# Patient Record
Sex: MALE | URBAN - METROPOLITAN AREA
[De-identification: names, ages, dates, MRNs, and addresses within clinical notes are randomized per-mention and may not be internally consistent; named-entity substitution may affect disease eponyms.]

---

## 2023-10-13 ENCOUNTER — EMERGENCY (EMERGENCY)
Facility: HOSPITAL | Age: 63
LOS: 1 days | Discharge: HOME | End: 2023-10-13
Attending: STUDENT IN AN ORGANIZED HEALTH CARE EDUCATION/TRAINING PROGRAM | Admitting: EMERGENCY MEDICINE
Payer: MEDICARE

## 2023-10-13 VITALS
HEART RATE: 74 BPM | OXYGEN SATURATION: 100 % | SYSTOLIC BLOOD PRESSURE: 112 MMHG | TEMPERATURE: 98 F | RESPIRATION RATE: 16 BRPM | DIASTOLIC BLOOD PRESSURE: 62 MMHG

## 2023-10-13 VITALS
RESPIRATION RATE: 18 BRPM | TEMPERATURE: 98 F | SYSTOLIC BLOOD PRESSURE: 102 MMHG | HEART RATE: 94 BPM | OXYGEN SATURATION: 99 % | DIASTOLIC BLOOD PRESSURE: 58 MMHG

## 2023-10-13 PROCEDURE — 99283 EMERGENCY DEPT VISIT LOW MDM: CPT

## 2023-10-13 NOTE — ED PROVIDER NOTE - PATIENT PORTAL LINK FT
You can access the FollowMyHealth Patient Portal offered by [facility] by registering at the following website: [email or web address]. By joining [facility]’s FollowMyHealth portal, you will also be able to view your health information using other applications (apps) compatible with our system.

## 2023-10-13 NOTE — ED ADULT NURSE NOTE - NSFALLHARMRISKINTERV_ED_ALL_ED
Assistance OOB with selected safe patient handling equipment if applicable/Assistance with ambulation/Communicate risk of Fall with Harm to all staff, patient, and family/Monitor gait and stability/Monitor for mental status changes and reorient to person, place, and time, as needed/Provide visual cue: red socks, yellow wristband, yellow gown, etc/Reinforce activity limits and safety measures with patient and family/Toileting schedule using arm’s reach rule for commode and bathroom/Use of alarms - bed, stretcher, chair and/or video monitoring/Bed in lowest position, wheels locked, appropriate side rails in place/Call bell, personal items and telephone in reach/Instruct patient to call for assistance before getting out of bed/chair/stretcher/Non-slip footwear applied when patient is off stretcher/[place] to call system/Physically safe environment - no spills, clutter or unnecessary equipment/Purposeful Proactive Rounding/Room/bathroom lighting operational, light cord in reach

## 2023-10-13 NOTE — ED ADULT TRIAGE NOTE - CHIEF COMPLAINT QUOTE
PD found person at a [employer] standing aimlessly. initially registered as a critical [facility] but was found to be  announced as  a missing person form group home. phx  schizophrenia, intellectual disability, pt changed into hospital attire, belongings secured for elopement prevention, no visible signs of injury noted on pt, FS  110

## 2023-10-13 NOTE — ED ADULT TRIAGE NOTE - NS ED NURSE BANDS TYPE
Name band; Split-Thickness Skin Graft Text: The defect edges were debeveled with a #15 scalpel blade.  Given the location of the defect, shape of the defect and the proximity to free margins a split thickness skin graft was deemed most appropriate.  Using a sterile surgical marker, the primary defect shape was transferred to the donor site. The split thickness graft was then harvested.  The skin graft was then placed in the primary defect and oriented appropriately.

## 2023-10-13 NOTE — ED PROVIDER NOTE - CLINICAL SUMMARY MEDICAL DECISION MAKING FREE TEXT BOX
63-year-old male, past medical of intellectual delay and schizophrenia, presents to ED for medical evaluation status post eloping from group home earlier today.  Patient accompanied by group home staff, who provides most of the history given that patient is nonverbal at baseline.  Per staff, patient seemingly eloped from group home at around 9:30 AM per camera feed.  Per group home police were called and missing person report was made.  Patient later found 3 hours later at local [employer] restaurant on [facility].  Group home staff brought to ED for evaluation due to not having patient for the last 3 hours.    Vital signs stable.  Physical exam significant for well-appearing patient in no acute distress.  Head is normocephalic/atraumatic.  Extraocular movements intact.  Pupils equal round reactive to light.  Heart is regular rate and rhythm.  No chest wall tenderness/ecchymosis.  Abdomen is soft/nontender.  No CVAT.  Pulses are normal throughout.  No lower extremity edema.  Skin is warm and well-perfused.  No rashes.  Neuro exam is nonfocal.  Otherwise unremarkable physical exam.    Patient without any medical issues at this time.  Patient is medically cleared to go back to group home at this time.

## 2023-10-13 NOTE — ED ADULT NURSE NOTE - OBJECTIVE STATEMENT
Pt is calm and cooperative in triage, picked up by group home staff member Arslan Erazo # [phone]. Evaluated by Dr Simi Godwin in triage and cleared for discharge back to group [facility]. Belongings given back to [facility] staff, pt changed and picked up by [facility] transport.

## 2024-05-13 ENCOUNTER — OUTPATIENT (OUTPATIENT)
Dept: OUTPATIENT SERVICES | Facility: HOSPITAL | Age: 64
LOS: 1 days | End: 2024-05-13
Payer: MEDICARE

## 2024-05-13 VITALS — HEIGHT: 65 IN | WEIGHT: 145.95 LBS

## 2024-05-13 DIAGNOSIS — K05.6 PERIODONTAL DISEASE, UNSPECIFIED: ICD-10-CM

## 2024-05-13 DIAGNOSIS — K02.62 DENTAL CARIES ON SMOOTH SURFACE PENETRATING INTO DENTIN: ICD-10-CM

## 2024-05-13 DIAGNOSIS — Z78.9 OTHER SPECIFIED HEALTH STATUS: Chronic | ICD-10-CM

## 2024-05-13 DIAGNOSIS — Z01.818 ENCOUNTER FOR OTHER PREPROCEDURAL EXAMINATION: ICD-10-CM

## 2024-05-13 DIAGNOSIS — K02.9 DENTAL CARIES, UNSPECIFIED: ICD-10-CM

## 2024-05-13 RX ORDER — EPINEPHRINE 0.3 MG/.3ML
0.3 INJECTION INTRAMUSCULAR; SUBCUTANEOUS
Refills: 0 | DISCHARGE

## 2024-05-13 RX ORDER — BUDESONIDE, MICRONIZED 100 %
2 POWDER (GRAM) MISCELLANEOUS
Refills: 0 | DISCHARGE

## 2024-05-13 RX ORDER — DIPHENHYDRAMINE HCL 50 MG
1 CAPSULE ORAL
Refills: 0 | DISCHARGE

## 2024-05-13 RX ORDER — CARBAMIDE PEROXIDE 81.86 MG/ML
5 SOLUTION/ DROPS AURICULAR (OTIC)
Refills: 0 | DISCHARGE

## 2024-05-13 RX ORDER — TAMSULOSIN HYDROCHLORIDE 0.4 MG/1
2 CAPSULE ORAL
Refills: 0 | DISCHARGE

## 2024-05-13 RX ORDER — FLUOXETINE HCL 10 MG
1 CAPSULE ORAL
Refills: 0 | DISCHARGE

## 2024-05-13 RX ORDER — ALBUTEROL 90 UG/1
3 AEROSOL, METERED ORAL
Refills: 0 | DISCHARGE

## 2024-05-13 RX ORDER — LURASIDONE HYDROCHLORIDE 40 MG/1
1 TABLET ORAL
Refills: 0 | DISCHARGE

## 2024-05-13 NOTE — H&P PST ADULT - HISTORY OF PRESENT ILLNESS
62 yo male presents to [facility] prior to scheduled comprehensive dental treatment on 5/31/24 with Dr. Yessi Loera. PMhx includes severe intellectual disability (nonverbal), personality disorder, chronic paranoid schizophrenia, anxiety, vit D deficiency, hydrocephalus, cellulitis (treated 2/2024).  62 yo male presents to [facility] prior to scheduled comprehensive dental treatment on 5/31/24 with Dr. Yessi Loera. PMhx includes severe intellectual disability (nonverbal), personality disorder, chronic paranoid schizophrenia, anxiety, vit D deficiency, hydrocephalus, cellulitis (treated 2/2024).     Very limited exam d/t uncooperative patient. Limited information - awaiting email from [facility] supervisor. Supervisor reports patient is new to the facility, has limited information on his medical history. Will go through records and email information.     Patient has productive cough; staff deny fever, chills. Chest xray ordered. 64 yo male presents to PST prior to scheduled comprehensive dental treatment on 5/31/24 with Dr. Yessi Loera. PMhx includes severe intellectual disability (nonverbal), personality disorder, chronic paranoid schizophrenia, anxiety, vit D deficiency, hydrocephalus, cellulitis (treated 2/2024).     Very limited exam d/t uncooperative patient. Limited information - awaiting email from [facility] supervisor. Supervisor reports patient is new to the facility, has limited information on his medical history. Will go through records and email information.   Unclear if patient's brother Emiliano Mcmahon is guardian - [phone]. Unable to reach at time of PST.  Facility    - [facility] - [phone] - unable to reach at time of PST.    Patient has productive cough; staff deny fever, chills. Chest xray ordered. 62 yo male presents to PST prior to scheduled comprehensive dental treatment on 5/31/24 with Dr. Yessi Loera. PMhx includes severe intellectual disability (nonverbal), personality disorder, chronic paranoid schizophrenia, anxiety, vit D deficiency, hydrocephalus, cellulitis (treated 2/2024).     Very limited exam d/t uncooperative patient. Limited information - awaiting email from [facility] supervisor. Supervisor reports patient is new to the facility, has limited information on his medical history. Will go through records and email information.   Unclear if patient's brother Emiliano Mcmahon is guardian - [phone]. Unable to reach at time of PST.   - [facility] - [phone] - unable to reach at time of PST.    Patient has productive cough; staff deny fever, chills. Chest xray ordered - patient did not tolerate.    ** 4:50 PM ** Spoke w/ KATE Urias at Group Home. Patient will see PCP for clearance and staff will send pulmonary note for clarification - unable to provide name of pulmonologist at this time. Patient will take Ativan and Benadryl prior to surgery and future medical appointments as patient "gets very combative".  64 yo male presents to PST prior to scheduled comprehensive dental treatment on 5/31/24 with Dr. Yessi Loera. PMhx includes asthma (staff deny recent exacerbations/hospitalizations; on Pulmicort daily), severe intellectual disability (nonverbal), personality disorder, chronic paranoid schizophrenia, anxiety, vit D deficiency, hydrocephalus, cellulitis (treated 2/2024).     Very limited exam d/t uncooperative patient. Limited information - awaiting email from [facility] supervisor. Supervisor reports patient is new to the facility, has limited information on his medical history. Will go through records and email information.   Unclear if patient's brother Emiliano Mcmahon is guardian - [phone]. Unable to reach at time of PST.   - [facility] - [phone] - unable to reach at time of PST.    Patient has productive cough; staff deny fever, chills. Chest xray ordered - patient did not tolerate.    ** 4:50 PM ** Spoke w/ KATE Urias at Group Home. Patient will see PCP for clearance and staff will send pulmonary note for clarification - unable to provide name of pulmonologist at this time. Patient will take Ativan and Benadryl prior to surgery and future medical appointments as patient "gets very combative".

## 2024-05-13 NOTE — H&P PST ADULT - NSICDXPASTMEDICALHX_GEN_ALL_CORE_FT
PAST MEDICAL HISTORY:  Cellulitis of foot     Chronic paranoid schizophrenia     H/O hydrocephalus     H/O personality disorder     History of BPH     Severe intellectual disability

## 2024-05-13 NOTE — H&P PST ADULT - NSANTHOSAYNRD_GEN_A_CORE
No. LARY screening performed.  STOP BANG Legend: 0-2 = LOW Risk; 3-4 = INTERMEDIATE Risk; 5-8 = HIGH Risk

## 2024-05-13 NOTE — H&P PST ADULT - PROBLEM SELECTOR PLAN 1
Comprehensive dental treatment under anesthesia on 5/31/24 with Dr. Yessi Loera.  Pre-op instructions given. Questions answered. Comprehensive dental treatment under anesthesia on 5/31/24 with Dr. Yessi Loera.  Pre-op instructions given. Questions answered.  Medical evaluation prior to surgery.  Chest xray ordered today. Comprehensive dental treatment under anesthesia on 5/31/24 with Dr. Yessi Loera.  Pre-op instructions given. Questions answered.  Medical evaluation prior to surgery.  Advised to pre-medicate prior to procedure with Ativan/Benadryl as prescribed.

## 2024-05-13 NOTE — H&P PST ADULT - ASSESSMENT
DASI score:  DASI activity:  Loose teeth or denture:  DASI score: 6  DASI activity: walks, 2+ flights of stairs - medical assistant reports pt is "Short of breath" when active + cough  Loose teeth or denture: unknown

## 2024-05-13 NOTE — H&P PST ADULT - WEIGHT IN KG
Per patient's history, there seem to be aspects of orthostatic hypotension, or possible vasovagal syncope. However, he has been having increasing light-headedness with exertion which is concerning for cardiac etiology. EKG in the clinic was reassuring. Pt also has apparently chronic hypertension but there is a significant difference of about 20 points systolic BP between the two arms. He reports some calf cramping at night which started as soon as he started taking losartan, but no leg swelling, no new cough with the leg cramping, and patient denies ever having chest pain. Wells score is zero; unlikely PE. No signs of seizure based on HPI, no personal or family hx of seizure.  DDx includes orthostatic hypotension, vasovagal syncope, cardiomyopathy/HF/structural heart disorder, subclavian steal syndrome, COPD.  -ER precautions reviewed with patient  -Echocardiogram ordered  -Consider CT chest, although pt does have Cr=1.8 recent labs and no baseline.  -Urgent referral to cardiology placed   66.2

## 2024-12-06 ENCOUNTER — APPOINTMENT (OUTPATIENT)
Dept: RADIOLOGY | Facility: IMAGING CENTER | Age: 64
End: 2024-12-06
Payer: MEDICARE

## 2024-12-06 ENCOUNTER — OUTPATIENT (OUTPATIENT)
Dept: OUTPATIENT SERVICES | Facility: HOSPITAL | Age: 64
LOS: 1 days | End: 2024-12-06
Payer: MEDICARE

## 2024-12-06 DIAGNOSIS — Z78.9 OTHER SPECIFIED HEALTH STATUS: Chronic | ICD-10-CM

## 2024-12-06 DIAGNOSIS — M1A.0720 IDIOPATHIC CHRONIC GOUT, LEFT ANKLE AND FOOT, WITHOUT TOPHUS (TOPHI): ICD-10-CM

## 2024-12-06 PROBLEM — Z87.438 PERSONAL HISTORY OF OTHER DISEASES OF MALE GENITAL ORGANS: Chronic | Status: ACTIVE | Noted: 2024-05-13

## 2024-12-06 PROBLEM — Z00.00 ENCOUNTER FOR PREVENTIVE HEALTH EXAMINATION: Status: ACTIVE | Noted: 2024-12-06

## 2024-12-06 PROBLEM — F20.0 PARANOID SCHIZOPHRENIA: Chronic | Status: ACTIVE | Noted: 2024-05-13

## 2024-12-06 PROBLEM — Z86.59 PERSONAL HISTORY OF OTHER MENTAL AND BEHAVIORAL DISORDERS: Chronic | Status: ACTIVE | Noted: 2024-05-13

## 2024-12-06 PROBLEM — F72 SEVERE INTELLECTUAL DISABILITIES: Chronic | Status: ACTIVE | Noted: 2024-05-13

## 2024-12-06 PROBLEM — L03.119 CELLULITIS OF UNSPECIFIED PART OF LIMB: Chronic | Status: ACTIVE | Noted: 2024-05-13

## 2024-12-06 PROBLEM — Z86.69 PERSONAL HISTORY OF OTHER DISEASES OF THE NERVOUS SYSTEM AND SENSE ORGANS: Chronic | Status: ACTIVE | Noted: 2024-05-13

## 2024-12-06 PROCEDURE — 73610 X-RAY EXAM OF ANKLE: CPT | Mod: 26,RT

## 2024-12-06 PROCEDURE — 73610 X-RAY EXAM OF ANKLE: CPT

## 2024-12-06 PROCEDURE — 73630 X-RAY EXAM OF FOOT: CPT | Mod: 26,RT

## 2024-12-06 PROCEDURE — 73630 X-RAY EXAM OF FOOT: CPT

## 2025-01-16 ENCOUNTER — OUTPATIENT (OUTPATIENT)
Dept: OUTPATIENT SERVICES | Facility: HOSPITAL | Age: 65
LOS: 1 days | End: 2025-01-16
Payer: MEDICARE

## 2025-01-16 VITALS — HEIGHT: 68 IN | WEIGHT: 158.07 LBS

## 2025-01-16 DIAGNOSIS — K05.6 PERIODONTAL DISEASE, UNSPECIFIED: ICD-10-CM

## 2025-01-16 DIAGNOSIS — Z78.9 OTHER SPECIFIED HEALTH STATUS: Chronic | ICD-10-CM

## 2025-01-16 DIAGNOSIS — Z01.818 ENCOUNTER FOR OTHER PREPROCEDURAL EXAMINATION: ICD-10-CM

## 2025-01-16 PROCEDURE — G0463: CPT

## 2025-01-16 RX ORDER — RISPERIDONE 0.5 MG/1
1 TABLET ORAL
Refills: 0 | DISCHARGE

## 2025-01-16 NOTE — H&P PST ADULT - HISTORY OF PRESENT ILLNESS
64 year old male with PMH asthma (staff denies any exacerbations/hospitalizations; on pulmicort daily), severe intellectual disability (nonverbal), personality disorder, chronic paranoid schizophrenia, anxiety, hydrocephalus, cellulitis (treated 2/2024) and dental caries presents to PST for scheduled comprehensive dental treatment under anesthesia with Dr. La on 2/5/25 at the ambulatory care center.    **Of note, pt uncooperative during PST visit. Pt lethargic due to pre-medication with ativan/benadryl as per group home staff however, when stimulated, pt extremely combative. Pending M/E and blood work with PCP prior to upcoming procedure.    Legal Guardian  Blaze Flannery  [phone]    Resides at [address]    Cain Valentin  [phone] 64 year old male with PMH asthma (staff denies any exacerbations/hospitalizations; on pulmicort daily), severe intellectual disability (nonverbal), personality disorder, chronic paranoid schizophrenia, anxiety, hydrocephalus, cellulitis (treated 2/2024) and dental caries presents to PST for scheduled comprehensive dental treatment under anesthesia with Dr. La on 2/5/25 at the ambulatory care center.    **Of note, pt uncooperative during PST visit. Pt lethargic due to pre-medication with ativan/benadryl as per group home staff however, when stimulated, pt extremely combative. Pending M/E and blood work with PCP prior to upcoming procedure.    Legal Guardian (Brother)  Blaze Flannery  [phone]    Resides at [address]    Cain Valentin  [phone] 64 year old male with PMH asthma (staff denies any exacerbations/hospitalizations; on pulmicort daily), severe intellectual disability (nonverbal), personality disorder, chronic paranoid schizophrenia, anxiety, hydrocephalus, cellulitis (treated 2/2024) and dental caries presents to PST for scheduled comprehensive dental treatment under anesthesia with Dr. La on 2/5/25 at the ambulatory care center.    **Of note, pt uncooperative during PST visit. Pt lethargic due to pre-medication with ativan/benadryl as per group home staff however, when stimulated, pt extremely combative. Pending M/E and blood work with PCP prior to upcoming procedure. Pt had PST for same procedure 5/2024 however, he did not have the procedure done. Group home staff/coordinator unsure as to why the procedure was not performed.    Legal Guardian (Brother)  Blaze Flannery  [phone]    Resides at [address]    Cain Valentin  [phone]

## 2025-01-16 NOTE — H&P PST ADULT - ASSESSMENT
DASI score: 4.31  DASI activity: Able to walk 2-3 blocks, assistance with ADLs, 1 Flight of Stairs, attends day program M-F  Loose teeth or denture: denies

## 2025-01-16 NOTE — H&P PST ADULT - NSICDXPASTMEDICALHX_GEN_ALL_CORE_FT
PAST MEDICAL HISTORY:  Cellulitis of foot     Chronic paranoid schizophrenia     Dental caries on smooth surface penetrating into dentin     H/O hydrocephalus     H/O personality disorder     History of BPH     Periodontal disease, unspecified     Severe intellectual disability      PAST MEDICAL HISTORY:  Asthma     Cellulitis of foot     Chronic paranoid schizophrenia     Dental caries on smooth surface penetrating into dentin     H/O hydrocephalus     H/O personality disorder     History of BPH     Periodontal disease, unspecified     Severe intellectual disability

## 2025-01-16 NOTE — H&P PST ADULT - PROBLEM SELECTOR PLAN 1
Pt is scheduled for a comprehensive dental treatment under anesthesia with Dr. La on 2/5/25 at the ambulatory care center  Labs to be drawn at M/E as patient was combative upon trying to remove winter jacket, pending CBC and BMP  Pending M/E, appt TBD

## 2025-02-05 ENCOUNTER — TRANSCRIPTION ENCOUNTER (OUTPATIENT)
Age: 65
End: 2025-02-05

## 2025-02-19 ENCOUNTER — OUTPATIENT (OUTPATIENT)
Dept: OUTPATIENT SERVICES | Facility: HOSPITAL | Age: 65
LOS: 1 days | End: 2025-02-19

## 2025-02-19 VITALS — HEIGHT: 66 IN | RESPIRATION RATE: 14 BRPM | WEIGHT: 156.97 LBS | TEMPERATURE: 98 F

## 2025-02-19 DIAGNOSIS — Z78.9 OTHER SPECIFIED HEALTH STATUS: Chronic | ICD-10-CM

## 2025-02-19 DIAGNOSIS — K02.9 DENTAL CARIES, UNSPECIFIED: ICD-10-CM

## 2025-02-19 DIAGNOSIS — K05.6 PERIODONTAL DISEASE, UNSPECIFIED: ICD-10-CM

## 2025-02-19 PROBLEM — J45.909 UNSPECIFIED ASTHMA, UNCOMPLICATED: Chronic | Status: ACTIVE | Noted: 2025-01-16

## 2025-02-19 PROBLEM — K02.62 DENTAL CARIES ON SMOOTH SURFACE PENETRATING INTO DENTIN: Chronic | Status: ACTIVE | Noted: 2025-01-16

## 2025-02-19 NOTE — H&P PST ADULT - OTHER CARE PROVIDERS
Dr. Janusz Combs ([facility]) [phone] Dr. Janusz Combs ([facility]) [phone], Dr. Ziyad Alegria psych

## 2025-02-19 NOTE — H&P PST ADULT - CONSTITUTIONAL COMMENTS
pt is combative easily agitated,  unable to do physical exam,  only says one word NO, does not follow commands pt is combative, uncooperative  easily agitated,  unable to do physical exam,  only says one word NO, does not follow commands

## 2025-02-19 NOTE — H&P PST ADULT - NSICDXPASTMEDICALHX_GEN_ALL_CORE_FT
PAST MEDICAL HISTORY:  Asthma     Cellulitis of foot     Chronic paranoid schizophrenia     Dental caries on smooth surface penetrating into dentin     H/O hydrocephalus     H/O personality disorder     History of BPH     Periodontal disease, unspecified     Severe intellectual disability      PAST MEDICAL HISTORY:  Asthma     Cellulitis of foot     Chronic paranoid schizophrenia     Constipation     Dental caries on smooth surface penetrating into dentin     H/O hydrocephalus     H/O personality disorder     History of BPH     History of COPD     Periodontal disease, unspecified     Severe intellectual disability

## 2025-02-19 NOTE — H&P PST ADULT - PROBLEM SELECTOR PLAN 1
Pt scheduled for comprehensive dental treatment under general anesthesia on 2/21/2025.  Preop teaching done, Lacho able to verbalize understanding.   medication day of procedure ativan, risperidone, fluoxetine, albuterol, budesonide      Legal Guardian (Brother)Blaze Flannery [phone] Pt scheduled for comprehensive dental treatment under general anesthesia on 2/21/2025.  Preop teaching done, Lacho able to verbalize understanding.   medication day of procedure ativan, risperidone, fluoxetine, albuterol, budesonide      Legal Guardian (Brother)Blaze Flannery [phone] Pt scheduled for comprehensive dental treatment under general anesthesia on 2/21/2025.  Preop teaching done, Lacho able to verbalize understanding.   medication day of procedure ativan, risperidone, fluoxetine, albuterol, budesonide     [facility] anesthesia floor email notified pt is uncooperative/ combative      Legal Guardian (Brother)Blaze Avelarez [phone]-  spoke with via telephone will be available to day of procedure for verbal consent Pt scheduled for comprehensive dental treatment under general anesthesia on 2/21/2025.  Preop teaching done, Lacho able to verbalize understanding.   Pt combative and uncooperative unable to do physical and vital signs  medication day of procedure ativan, risperidone, fluoxetine, albuterol, budesonide     [facility] anesthesia floor email notified pt is uncooperative/ combative      Legal Guardian (Brother)Blaze Flannery [phone]-  spoke with via telephone will be available to day of procedure for verbal consent

## 2025-02-19 NOTE — H&P PST ADULT - NSANTHAGERD_ENT_A_CORE

## 2025-02-19 NOTE — H&P PST ADULT - NEGATIVE GENERAL SYMPTOMS
no fever/no chills/no sweating/no anorexia/no polyphagia/no polyuria/no polydipsia/no malaise/no fatigue no fever/no chills/no sweating/no anorexia/no weight loss/no weight gain/no polyphagia/no polyuria/no polydipsia/no malaise/no fatigue

## 2025-02-19 NOTE — H&P PST ADULT - COMMENTS
unable to obtain hr, bp, O2 sat pt combative unable to obtain hr, bp, O2 sat pt combative/ uncooperative

## 2025-02-19 NOTE — H&P PST ADULT - GENITOURINARY COMMENTS
Please take the antibiotics until complete.  I highly recommend using a probiotic for the week of the antibiotics and for a week to follow.  You may use Flonase 2 sprays in each nostril daily which can help with congestion and inflammation in the sinuses nasal passages.  Mucinex may be helpful for your secretions.    If despite these measures ear symptoms worsen or fail to improve in the next 5-7 days, please return for re-evaluation.    
able to use the bathroom to urinate

## 2025-02-19 NOTE — H&P PST ADULT - HISTORY OF PRESENT ILLNESS
64 year old male with PMH asthma (staff denies any exacerbations/hospitalizations; on pulmicort daily), severe intellectual disability (nonverbal), personality disorder, chronic paranoid schizophrenia, anxiety, hydrocephalus, cellulitis (treated 2/2024) and dental caries presents to PST for scheduled comprehensive dental treatment under anesthesia with Dr. La on 2/5/25 at the ambulatory care center.    **Of note, pt uncooperative during PST visit. Pt lethargic due to pre-medication with ativan/benadryl as per group home staff however, when stimulated, pt extremely combative. Pending M/E and blood work with PCP prior to upcoming procedure. Pt had PST for same procedure 5/2024 however, he did not have the procedure done. Group home staff/coordinator unsure as to why the procedure was not performed.    Legal Guardian (Brother)  Blaze Flannery  [phone]    Resides at [address]    Cain Valentin  [phone] 63y/o male scheduled for comprehensive dental treatment under general anesthesia on 2/21/2025.  Pt with hx copd/asthma severe intellectual disability, personality disorder, chronic paranoid schizophrenia, anxiety, hydrocephalus, cellulitis (treated 2/2024).  Was previously scheduled to have the procedure at [place] 2/5/2025, canceled due to ate prior to procedure.

## 2025-02-20 NOTE — ASU PATIENT PROFILE, ADULT - NSICDXPASTMEDICALHX_GEN_ALL_CORE_FT
PAST MEDICAL HISTORY:  Asthma     Cellulitis of foot     Chronic paranoid schizophrenia     Constipation     Dental caries on smooth surface penetrating into dentin     H/O hydrocephalus     H/O personality disorder     History of BPH     History of COPD     Periodontal disease, unspecified     Severe intellectual disability

## 2025-02-20 NOTE — ASU PATIENT PROFILE, ADULT - FALL HARM RISK - HARM RISK INTERVENTIONS

## 2025-02-21 ENCOUNTER — TRANSCRIPTION ENCOUNTER (OUTPATIENT)
Age: 65
End: 2025-02-21

## 2025-02-21 ENCOUNTER — OUTPATIENT (OUTPATIENT)
Dept: INPATIENT UNIT | Facility: HOSPITAL | Age: 65
LOS: 1 days | Discharge: HOME | End: 2025-02-21

## 2025-02-21 VITALS — HEIGHT: 66 IN | DIASTOLIC BLOOD PRESSURE: 86 MMHG | WEIGHT: 156.97 LBS | SYSTOLIC BLOOD PRESSURE: 137 MMHG

## 2025-02-21 VITALS
HEART RATE: 83 BPM | OXYGEN SATURATION: 96 % | RESPIRATION RATE: 16 BRPM | DIASTOLIC BLOOD PRESSURE: 83 MMHG | SYSTOLIC BLOOD PRESSURE: 153 MMHG

## 2025-02-21 DIAGNOSIS — Z78.9 OTHER SPECIFIED HEALTH STATUS: Chronic | ICD-10-CM

## 2025-02-21 DIAGNOSIS — K02.62 DENTAL CARIES ON SMOOTH SURFACE PENETRATING INTO DENTIN: ICD-10-CM

## 2025-02-21 DEVICE — SURGICEL 2 X 14": Type: IMPLANTABLE DEVICE | Status: FUNCTIONAL

## 2025-02-21 RX ORDER — KETOCONAZOLE 20 MG/G
1 CREAM TOPICAL
Refills: 0 | DISCHARGE

## 2025-02-21 RX ORDER — ALBUTEROL SULFATE 2.5 MG/3ML
0 VIAL, NEBULIZER (ML) INHALATION
Refills: 0 | DISCHARGE

## 2025-02-21 RX ORDER — SODIUM CHLORIDE 9 G/1000ML
1000 INJECTION, SOLUTION INTRAVENOUS
Refills: 0 | Status: ACTIVE | OUTPATIENT
Start: 2025-02-21 | End: 2026-01-20

## 2025-02-21 RX ORDER — MIDAZOLAM IN 0.9 % SOD.CHLORID 1 MG/ML
15 PLASTIC BAG, INJECTION (ML) INTRAVENOUS ONCE
Refills: 0 | Status: DISCONTINUED | OUTPATIENT
Start: 2025-02-21 | End: 2025-02-21

## 2025-02-21 RX ORDER — FENTANYL CITRATE-0.9 % NACL/PF 100MCG/2ML
50 SYRINGE (ML) INTRAVENOUS ONCE
Refills: 0 | Status: DISCONTINUED | OUTPATIENT
Start: 2025-02-21 | End: 2025-02-21

## 2025-02-21 RX ORDER — FENTANYL CITRATE-0.9 % NACL/PF 100MCG/2ML
25 SYRINGE (ML) INTRAVENOUS
Refills: 0 | Status: DISCONTINUED | OUTPATIENT
Start: 2025-02-21 | End: 2025-02-21

## 2025-02-21 RX ORDER — ONDANSETRON HCL/PF 4 MG/2 ML
4 VIAL (ML) INJECTION EVERY 6 HOURS
Refills: 0 | Status: ACTIVE | OUTPATIENT
Start: 2025-02-21 | End: 2026-01-20

## 2025-02-21 RX ADMIN — Medication 15 MILLIGRAM(S): at 14:19

## 2025-02-21 NOTE — ASU DISCHARGE PLAN (ADULT/PEDIATRIC) - NURSING INSTRUCTIONS
You received IV Tylenol for pain management at 3:25 PM . Please DO NOT take any Tylenol (Acetaminophen) containing products, such as Vicodin, Percocet, Excedrin, and cold medications for the next 6 hours (until 9:25 PM). DO NOT TAKE MORE THAN 4000 MG OF TYLENOL in a 24 hour period.    You received IV Toradol for pain management at 3:30 PM. Please DO NOT take Motrin/Ibuprofen/Advil/Aleve/NSAIDs (Non-Steroidal Anti-Inflammatory Drugs) for the next 6 hours (until 9:30 PM).

## 2025-02-21 NOTE — ASU DISCHARGE PLAN (ADULT/PEDIATRIC) - NS MD DC FALL RISK RISK
For information on Fall & Injury Prevention, visit: [email or web address] OR  [email or web address] OR  https://www.cdc.gov/steadi/patient.html

## 2025-02-21 NOTE — ASU DISCHARGE PLAN (ADULT/PEDIATRIC) - ASU DC SPECIAL INSTRUCTIONSFT
comprehensive dental treatment under general anesthesia, exam, xrays, cleaning, and fluoride and one extraction of one molar performed to the lower right side and one premolar to the UR due to infection    clindamycin will be prescribed, no straw for two days and keep head elevated for the next two days and nights     tylenol prn pain and give him tylenol or motrin for the next two days for comfort     see DR La in one week, appointment will be at [facility] [phone] on 3/3 at 1:15 pm     resume all medications    return to routine activities tomorrow if patient feels well    ice to the face right side 20 minutes on and off today

## 2025-02-21 NOTE — ASU DISCHARGE PLAN (ADULT/PEDIATRIC) - FINANCIAL ASSISTANCE
[facility] provides services at a reduced cost to those who are determined to be eligible through [facility]’s financial assistance program. Information regarding [facility]’s financial assistance program can be found by going to [email or web address] or by calling [phone].

## (undated) DEVICE — DRSG CURITY GAUZE SPONGE 4 X 4" 12-PLY

## (undated) DEVICE — DRAPE 3/4 SHEET 52X76"

## (undated) DEVICE — DRAPE CAMERA ENDOMATE

## (undated) DEVICE — DRAPE LIGHT HANDLE COVER (GREEN)

## (undated) DEVICE — DRAPE ISOLATION BAG 20X20"

## (undated) DEVICE — DRAPE SURGICAL #1010

## (undated) DEVICE — DRAPE INSTRUMENT POUCH 6.75" X 11"

## (undated) DEVICE — VAGINAL PACKING 2"

## (undated) DEVICE — SYR ASEPTO

## (undated) DEVICE — SUCTION YANKAUER NO CONTROL VENT

## (undated) DEVICE — POSITIONER FOAM HEAD CRADLE (PINK)

## (undated) DEVICE — SPONGE END-STRUNG CYLINDRICAL .5X1.5"

## (undated) DEVICE — LABELS BLANK W PEN

## (undated) DEVICE — ELCTR GROUNDING PAD ADULT COVIDIEN

## (undated) DEVICE — DRAPE SPLIT SHEET 77" X 120"

## (undated) DEVICE — TUBING SUCTION NONCONDUCTIVE 6MM X 12FT

## (undated) DEVICE — BASIN SET DOUBLE

## (undated) DEVICE — VENODYNE/SCD SLEEVE CALF MEDIUM

## (undated) DEVICE — GOWN XL

## (undated) DEVICE — SOL IRR POUR NS 0.9% 500ML

## (undated) DEVICE — DRAPE MAGNETIC INSTRUMENT MEDIUM